# Patient Record
Sex: MALE | Employment: UNEMPLOYED | URBAN - METROPOLITAN AREA
[De-identification: names, ages, dates, MRNs, and addresses within clinical notes are randomized per-mention and may not be internally consistent; named-entity substitution may affect disease eponyms.]

---

## 2021-01-30 ENCOUNTER — HOSPITAL ENCOUNTER (EMERGENCY)
Facility: CLINIC | Age: 41
Discharge: HOME | End: 2021-01-30
Attending: EMERGENCY MEDICINE | Admitting: EMERGENCY MEDICINE
Payer: OTHER GOVERNMENT

## 2021-01-30 ENCOUNTER — APPOINTMENT (OUTPATIENT)
Dept: CT IMAGING | Facility: CLINIC | Age: 41
End: 2021-01-30
Attending: EMERGENCY MEDICINE
Payer: OTHER GOVERNMENT

## 2021-01-30 VITALS
HEIGHT: 72 IN | DIASTOLIC BLOOD PRESSURE: 79 MMHG | SYSTOLIC BLOOD PRESSURE: 130 MMHG | WEIGHT: 187 LBS | BODY MASS INDEX: 25.33 KG/M2 | OXYGEN SATURATION: 98 % | RESPIRATION RATE: 16 BRPM | HEART RATE: 79 BPM | TEMPERATURE: 98.6 F

## 2021-01-30 DIAGNOSIS — N20.1 URETERAL STONE: ICD-10-CM

## 2021-01-30 LAB
ALBUMIN UR-MCNC: 10 MG/DL
ANION GAP SERPL CALCULATED.3IONS-SCNC: 4 MMOL/L (ref 3–14)
APPEARANCE UR: CLEAR
BACTERIA #/AREA URNS HPF: ABNORMAL /HPF
BILIRUB UR QL STRIP: NEGATIVE
BUN SERPL-MCNC: 14 MG/DL (ref 7–30)
CALCIUM SERPL-MCNC: 9.1 MG/DL (ref 8.5–10.1)
CHLORIDE SERPL-SCNC: 103 MMOL/L (ref 94–109)
CO2 SERPL-SCNC: 30 MMOL/L (ref 20–32)
COLOR UR AUTO: YELLOW
CREAT SERPL-MCNC: 1.31 MG/DL (ref 0.66–1.25)
GFR SERPL CREATININE-BSD FRML MDRD: 67 ML/MIN/{1.73_M2}
GLUCOSE SERPL-MCNC: 77 MG/DL (ref 70–99)
GLUCOSE UR STRIP-MCNC: NEGATIVE MG/DL
HGB UR QL STRIP: ABNORMAL
KETONES UR STRIP-MCNC: NEGATIVE MG/DL
LEUKOCYTE ESTERASE UR QL STRIP: NEGATIVE
MUCOUS THREADS #/AREA URNS LPF: PRESENT /LPF
NITRATE UR QL: NEGATIVE
PH UR STRIP: 5.5 PH (ref 5–7)
POTASSIUM SERPL-SCNC: 3.7 MMOL/L (ref 3.4–5.3)
RBC #/AREA URNS AUTO: 19 /HPF (ref 0–2)
SODIUM SERPL-SCNC: 137 MMOL/L (ref 133–144)
SOURCE: ABNORMAL
SP GR UR STRIP: 1.01 (ref 1–1.03)
UROBILINOGEN UR STRIP-MCNC: 0 MG/DL (ref 0–2)
WBC #/AREA URNS AUTO: 1 /HPF (ref 0–5)

## 2021-01-30 PROCEDURE — 99285 EMERGENCY DEPT VISIT HI MDM: CPT | Mod: 25

## 2021-01-30 PROCEDURE — 250N000011 HC RX IP 250 OP 636: Performed by: EMERGENCY MEDICINE

## 2021-01-30 PROCEDURE — 88300 SURGICAL PATH GROSS: CPT | Mod: 26 | Performed by: PATHOLOGY

## 2021-01-30 PROCEDURE — 96374 THER/PROPH/DIAG INJ IV PUSH: CPT

## 2021-01-30 PROCEDURE — 82365 CALCULUS SPECTROSCOPY: CPT | Performed by: EMERGENCY MEDICINE

## 2021-01-30 PROCEDURE — 88300 SURGICAL PATH GROSS: CPT | Mod: TC | Performed by: EMERGENCY MEDICINE

## 2021-01-30 PROCEDURE — 74176 CT ABD & PELVIS W/O CONTRAST: CPT

## 2021-01-30 PROCEDURE — 258N000003 HC RX IP 258 OP 636: Performed by: EMERGENCY MEDICINE

## 2021-01-30 PROCEDURE — 80048 BASIC METABOLIC PNL TOTAL CA: CPT | Performed by: EMERGENCY MEDICINE

## 2021-01-30 PROCEDURE — 96361 HYDRATE IV INFUSION ADD-ON: CPT

## 2021-01-30 PROCEDURE — 81001 URINALYSIS AUTO W/SCOPE: CPT | Performed by: EMERGENCY MEDICINE

## 2021-01-30 RX ORDER — ONDANSETRON 2 MG/ML
4 INJECTION INTRAMUSCULAR; INTRAVENOUS EVERY 30 MIN PRN
Status: DISCONTINUED | OUTPATIENT
Start: 2021-01-30 | End: 2021-01-30 | Stop reason: HOSPADM

## 2021-01-30 RX ORDER — SODIUM CHLORIDE 9 MG/ML
INJECTION, SOLUTION INTRAVENOUS CONTINUOUS
Status: DISCONTINUED | OUTPATIENT
Start: 2021-01-30 | End: 2021-01-30 | Stop reason: HOSPADM

## 2021-01-30 RX ORDER — MORPHINE SULFATE 4 MG/ML
4 INJECTION, SOLUTION INTRAMUSCULAR; INTRAVENOUS
Status: DISCONTINUED | OUTPATIENT
Start: 2021-01-30 | End: 2021-01-30 | Stop reason: HOSPADM

## 2021-01-30 RX ADMIN — MORPHINE SULFATE 4 MG: 4 INJECTION, SOLUTION INTRAMUSCULAR; INTRAVENOUS at 13:45

## 2021-01-30 RX ADMIN — SODIUM CHLORIDE 1000 ML: 9 INJECTION, SOLUTION INTRAVENOUS at 13:43

## 2021-01-30 ASSESSMENT — ENCOUNTER SYMPTOMS
RHINORRHEA: 0
COUGH: 0
HEADACHES: 0
FEVER: 0
HEMATURIA: 0
NAUSEA: 1
WEAKNESS: 0
CHILLS: 0
VOMITING: 0
BLOOD IN STOOL: 0
FLANK PAIN: 1

## 2021-01-30 ASSESSMENT — MIFFLIN-ST. JEOR: SCORE: 1787.26

## 2021-01-30 NOTE — ED PROVIDER NOTES
"  History   Chief Complaint:  Flank Pain       KALIN Gibson is a 40 year old male who presents with flank pain. The patient reports that early this morning he started to experience right sided flank pain that has remained persistent, prompting him to the ED. The patient notes that he is also experiencing an increased urgency of urination, but denies hematuria. Patient also endorses mild nausea. He describes his flank pain as a pressure sensation and that his current pain level is a 7/10. Patient's pain does not radiate. The patient denies blood in his stool, vomiting, testicular pain, cough, fever, chills, rhinorrhea, congestion, headache, leg weakness, and other issues.      Review of Systems   Constitutional: Negative for chills and fever.   HENT: Negative for rhinorrhea.    Respiratory: Negative for cough.    Gastrointestinal: Positive for nausea. Negative for blood in stool and vomiting.   Genitourinary: Positive for flank pain and urgency. Negative for hematuria and testicular pain.   Neurological: Negative for weakness and headaches.   All other systems reviewed and are negative.      Allergies:  The patient has no known allergies.     Medications:  The patient is not currently taking any prescribed medications.     Past Medical History:    The patient denies past medical history.      Past Surgical History:    The patient denies past surgical history including abdominal surgeries.      Family History:    The patient denies past family history including kidney stones.     Social History:  Patient presents to the ED alone.   Patient denies alcohol, drug, and tobacco use.     Physical Exam     Patient Vitals for the past 24 hrs:   BP Temp Temp src Pulse Resp SpO2 Height Weight   01/30/21 1400 -- -- -- -- -- 98 % -- --   01/30/21 1330 136/87 98.6  F (37  C) Temporal 83 16 98 % 1.822 m (5' 11.75\") 84.8 kg (187 lb)       Physical Exam  Constitutional: Well developed, nontox appearance  Head: Atraumatic. "   Neck:  no stridor  Eyes: no scleral icterus  Cardiovascular: RRR, 2+ bilat radial pulses  Pulmonary/Chest: nml resp effort  Abdominal: ND, soft, NT, no rebound or guarding   : R CVA tenderness  Ext: Warm, well perfused, no edema  Neurological: A&O, symmetric facies, moves ext x4  Skin: Skin is warm and dry.   Psychiatric: Behavior is normal. Thought content normal.   Nursing note and vitals reviewed.    Emergency Department Course     Imaging:  CT Abdomen Pelvis w/o Contrast   Final Result   IMPRESSION: Minimal hydronephrosis and stranding on the right which   may be related to recently passed stone. No stone demonstrated   currently.      BARTOLO SANCHEZ MD          Laboratory:  BMP: Creatinine 1.31 (H)    UA: Bloo Moderate, RBC 19 (H), Bacteria Few, Mucous Present, Protein Albumin 10, o/w Negative        Emergency Department Course:    Reviewed:  I reviewed nursing notes, vitals and past medical history    Assessments:  1336: I initially evaluated the patient in ED room 17.    1608: I reassessed the patient and updated on findings. Patient passed stone in the ED. Stone seen in urinal.     Interventions:  1343, NS 1L IV Bolus  1345, morphine, 4 mg, IV    Disposition:  The patient was discharged to home.     Impression & Plan   Medical Decision Makin year old male presenting w/ R flank pain, CVA tenderness     Signs and symptoms consistent with ureterolithiasis which CT demonstrated to have likely passed while in the emergency department.  Stone was noted in the patient's urinal after his symptoms significantly improved.  Doubt colitis, cholecystitis, aneurysm, dissection, volvulus, appendicitis, splenic pathology, ulcer,  pneumonia, rib fracture, UTI, pyelonephritis.  Given the patient's stone passed while in the emergency department, it was sent for stone analysis. No signs of coinciding UTI.  Patient is hemodynamically stable in ED.  Given the passing of the patient's kidney stone, at this time I feel  the patient is safe for discharge.  Recommendations given regarding follow up with PCP/Urology prn and return to the emergency department as needed for new or worsening symptoms.  Counseled on all results, diagnosis and disposition.  Pt understanding and agreeable to plan. Patient discharged in stable condition.       Diagnosis:    ICD-10-CM    1. Ureteral stone  N20.1        Scribe Disclosure:  Jordin NUNEZ, am serving as a scribe at 1:26 PM on 1/30/2021 to document services personally performed by Nabeel Kaplan MD based on my observations and the provider's statements to me.     [facility]   January 30, 2021      Nabeel Kaplan MD  01/30/21 [id number]

## 2021-01-30 NOTE — ED TRIAGE NOTES
R flank pain started this morning. Urinary hesitancy. Has not taken any pain medication for the pain.

## 2021-01-30 NOTE — DISCHARGE INSTRUCTIONS
1. -Take acetaminophen 500 to 1000 mg by mouth every 4 to 6 hours as needed for pain or fever.  Do not take more than 4000 mg in 24 hours.  Do not take within 6 hours of another acetaminophen containing medication such as norco (vicodin) or percocet.  - Take ibuprofen 600 to 800 mg by mouth every 6 to 8 hours as needed for pain or fever  2. Drink plenty of fluids at home.  3.

## 2021-02-02 LAB — COPATH REPORT: NORMAL

## 2021-02-04 LAB
APPEARANCE STONE: NORMAL
COMPN STONE: NORMAL
NUMBER STONE: 1
SIZE STONE: NORMAL MM
WT STONE: 6 MG